# Patient Record
Sex: FEMALE | URBAN - METROPOLITAN AREA
[De-identification: names, ages, dates, MRNs, and addresses within clinical notes are randomized per-mention and may not be internally consistent; named-entity substitution may affect disease eponyms.]

---

## 2022-07-28 ENCOUNTER — OFFICE VISIT (OUTPATIENT)
Dept: URBAN - METROPOLITAN AREA CLINIC 15 | Facility: CLINIC | Age: 57
End: 2022-07-28
Payer: COMMERCIAL

## 2022-07-28 DIAGNOSIS — H02.052 TRICHIASIS WITHOUT ENTROPION RIGHT LOWER EYELID: Primary | ICD-10-CM

## 2022-07-28 PROCEDURE — 67820 REVISE EYELASHES: CPT | Performed by: OPTOMETRIST

## 2022-07-28 ASSESSMENT — INTRAOCULAR PRESSURE
OS: 12
OD: 12

## 2022-07-28 NOTE — IMPRESSION/PLAN
Impression: Trichiasis without entropion right lower eyelid: H02.052. Plan: Removed 3 inward turned eyelashes of inferior lid with sterile forceps. Procedure well-tolerated by patient. 

Mariana destruction of follicles

## 2022-09-08 ENCOUNTER — OFFICE VISIT (OUTPATIENT)
Dept: URBAN - METROPOLITAN AREA CLINIC 15 | Facility: CLINIC | Age: 57
End: 2022-09-08
Payer: COMMERCIAL

## 2022-09-08 DIAGNOSIS — H02.052 TRICHIASIS WITHOUT ENTROPION RIGHT LOWER EYELID: ICD-10-CM

## 2022-09-08 DIAGNOSIS — L71.9 ROSACEA: Primary | ICD-10-CM

## 2022-09-08 PROCEDURE — 99213 OFFICE O/P EST LOW 20 MIN: CPT | Performed by: OPTOMETRIST

## 2022-09-08 PROCEDURE — 67820 REVISE EYELASHES: CPT | Performed by: OPTOMETRIST

## 2022-09-08 RX ORDER — TOBRAMYCIN / DEXAMETHASONE 3; .5 MG/ML; MG/ML
SUSPENSION/ DROPS OPHTHALMIC
Qty: 5 | Refills: 0 | Status: INACTIVE
Start: 2022-09-08 | End: 2022-09-08

## 2022-09-08 RX ORDER — DOXYCYCLINE HYCLATE 50 MG/1
50 MG CAPSULE, GELATIN COATED ORAL
Qty: 120 | Refills: 0 | Status: ACTIVE
Start: 2022-09-08

## 2022-09-08 RX ORDER — TOBRAMYCIN / DEXAMETHASONE 3; .5 MG/ML; MG/ML
SUSPENSION/ DROPS OPHTHALMIC
Qty: 5 | Refills: 0 | Status: ACTIVE
Start: 2022-09-08

## 2022-09-08 NOTE — IMPRESSION/PLAN
Impression: Trichiasis without entropion right lower eyelid: H02.052. Plan: Removed 12 turned eyelash on inferior lid with sterile forceps. Procedure well-tolerated by patient.

## 2022-09-19 ENCOUNTER — OFFICE VISIT (OUTPATIENT)
Dept: URBAN - METROPOLITAN AREA CLINIC 15 | Facility: CLINIC | Age: 57
End: 2022-09-19
Payer: COMMERCIAL

## 2022-09-19 DIAGNOSIS — L71.9 ROSACEA: Primary | ICD-10-CM

## 2022-09-19 DIAGNOSIS — H02.052 TRICHIASIS WITHOUT ENTROPION RIGHT LOWER EYELID: ICD-10-CM

## 2022-09-19 PROCEDURE — 99213 OFFICE O/P EST LOW 20 MIN: CPT | Performed by: OPTOMETRIST

## 2022-09-19 PROCEDURE — 67820 REVISE EYELASHES: CPT | Performed by: OPTOMETRIST

## 2022-09-19 RX ORDER — ERYTHROMYCIN 5 MG/G
OINTMENT OPHTHALMIC
Qty: 10 | Refills: 2 | Status: ACTIVE
Start: 2022-09-19

## 2022-09-19 ASSESSMENT — INTRAOCULAR PRESSURE
OS: 12
OD: 12

## 2022-09-19 NOTE — IMPRESSION/PLAN
Impression: Rosacea: L71.9. Plan: D/c Tobradex. Continue Doxy BID PO and Erythroymcin dariusz. Start Avenova Lid Scrubs BID OU.

## 2022-09-19 NOTE — IMPRESSION/PLAN
Impression: Trichiasis without entropion right lower eyelid: H02.052. Plan: Removed 3 inward turned eyelash on inferior lid with sterile forceps. Procedure well-tolerated by patient.

## 2022-10-06 ENCOUNTER — OFFICE VISIT (OUTPATIENT)
Dept: URBAN - METROPOLITAN AREA CLINIC 15 | Facility: CLINIC | Age: 57
End: 2022-10-06
Payer: COMMERCIAL

## 2022-10-06 DIAGNOSIS — H02.052 TRICHIASIS WITHOUT ENTROPION RIGHT LOWER EYELID: ICD-10-CM

## 2022-10-06 DIAGNOSIS — L71.9 ROSACEA: Primary | ICD-10-CM

## 2022-10-06 PROCEDURE — 67820 REVISE EYELASHES: CPT | Performed by: OPTOMETRIST

## 2022-10-06 PROCEDURE — 99212 OFFICE O/P EST SF 10 MIN: CPT | Performed by: OPTOMETRIST

## 2022-10-06 ASSESSMENT — INTRAOCULAR PRESSURE
OS: 11
OD: 11

## 2022-10-06 NOTE — IMPRESSION/PLAN
Impression: Trichiasis without entropion right lower eyelid: H02.052. Plan: Removed 4 inward turned eyelash inferior lid with sterile forceps. 
Procedure well-tolerated by patient

## 2022-10-06 NOTE — IMPRESSION/PLAN
Impression: Rosacea: L71.9. Plan: Improved with doxycycline BID PO, Avenova BID OU, Systane QID OU. Continue tx and continue as scheduled with Dr. Aaron Patel for IPL OU.

## 2022-10-19 ENCOUNTER — OFFICE VISIT (OUTPATIENT)
Dept: URBAN - METROPOLITAN AREA CLINIC 30 | Facility: CLINIC | Age: 57
End: 2022-10-19
Payer: COMMERCIAL

## 2022-10-19 DIAGNOSIS — H04.123 TEAR FILM INSUFFICIENCY OF BILATERAL LACRIMAL GLANDS: ICD-10-CM

## 2022-10-19 DIAGNOSIS — L71.9 ROSACEA: ICD-10-CM

## 2022-10-19 DIAGNOSIS — H02.882 MGD/MEIBOMITIS, RIGHT LOWER LID: ICD-10-CM

## 2022-10-19 DIAGNOSIS — H02.885 MGD/MEIBOMITIS, LEFT LOWER LID: Primary | ICD-10-CM

## 2022-10-19 PROCEDURE — 0330T TEAR FILM IMG UNI/BI W/I&R: CPT | Performed by: OPTOMETRIST

## 2022-10-19 RX ORDER — PREDNISOLONE ACETATE 10 MG/ML
1 % SUSPENSION/ DROPS OPHTHALMIC
Qty: 5 | Refills: 0 | Status: ACTIVE
Start: 2022-10-19

## 2022-10-19 NOTE — IMPRESSION/PLAN
Impression: Rosacea: L71.9. Plan: Per Dr. Ravi Teran notes: Improved with doxycycline hyclate 50 mg BID PO, Avenova BID OU, Systane QID OU.

## 2022-10-19 NOTE — IMPRESSION/PLAN
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 Plan: Pt is present for 1st IPL today. Start WC's qhs OU. Continue Erythromycin- using PRN-QID.

## 2022-10-19 NOTE — IMPRESSION/PLAN
Impression: Tear film insufficiency of bilateral lacrimal glands: H04.123.
**possible allergy to tobradex** Plan: 1st IPL today. Pt notes photophobia. Pt notes eye strain, and scratchiness/itchiness OU. Start PA 1% BID OU x 8 weeks. Continue Systane AT's qid OU. Recommend O3's. Avoid ceiling fans. **REVIEW OCULUS FINDINGS* DEC 10/2022. Pt ed of condition that DED tends to be chronic (there is no cure) and will take time to treat based on severity. Discussed in detail ADDE/ARTEM. Recommend blinking exercises, O3's, WCs, and ATs QID OU. Oculus Keratograph results indicate OD 40%, OS 60% MG atrophy. Discussed options for treatment such as IPL/ Ilux in order to maintain MG function. Pt aware of out of pocket cost $400.

## 2022-10-31 ENCOUNTER — OFFICE VISIT (OUTPATIENT)
Dept: URBAN - METROPOLITAN AREA CLINIC 15 | Facility: CLINIC | Age: 57
End: 2022-10-31
Payer: COMMERCIAL

## 2022-10-31 DIAGNOSIS — S05.00XA CORNEAL ABRASION W/O FB OF EYE, INITIAL ENCOUNTER: Primary | ICD-10-CM

## 2022-10-31 PROCEDURE — 92071 CONTACT LENS FITTING FOR TX: CPT | Performed by: OPTOMETRIST

## 2022-10-31 PROCEDURE — 99213 OFFICE O/P EST LOW 20 MIN: CPT | Performed by: OPTOMETRIST

## 2022-10-31 RX ORDER — CIPROFLOXACIN HYDROCHLORIDE 3 MG/ML
0.3 % SOLUTION/ DROPS OPHTHALMIC
Qty: 5 | Refills: 0 | Status: INACTIVE
Start: 2022-10-31 | End: 2022-11-01

## 2022-10-31 ASSESSMENT — INTRAOCULAR PRESSURE
OD: 12
OS: 12

## 2022-10-31 NOTE — IMPRESSION/PLAN
Impression: Corneal abrasion w/o FB of eye, initial encounter: S05.00XA. Plan: Educated patient on exam findings and discussed importance of treatment with good compliance, likelihood of scarring, and visual prognosis. Rx Ciprofloxacin TID OD x 7d, Start Systane/Refresh PFAT q2hrs. Patient to call if increase in pain, redness, light sensitivity, or if vision changes occur. Air Optix N&D BCL placed today, well-tolerated. Patient is previous CL wearer, advised to remove BCL Wednesday Morning after floating CL with several ATs. Advised patient to call immediately if any vision loss, increased redness, pain.

## 2022-11-21 ENCOUNTER — OFFICE VISIT (OUTPATIENT)
Dept: URBAN - METROPOLITAN AREA CLINIC 15 | Facility: CLINIC | Age: 57
End: 2022-11-21
Payer: COMMERCIAL

## 2022-11-21 DIAGNOSIS — S05.01XD CORNEAL ABRASION W/O FB OF RIGHT EYE, SUBSEQUENT ENCOUNTER: Primary | ICD-10-CM

## 2022-11-21 PROCEDURE — 99213 OFFICE O/P EST LOW 20 MIN: CPT | Performed by: OPTOMETRIST

## 2022-11-21 ASSESSMENT — INTRAOCULAR PRESSURE
OD: 12
OS: 11

## 2022-11-21 NOTE — IMPRESSION/PLAN
Impression: Corneal abrasion w/o FB of right eye, subsequent encounter: S05. 01XD. Plan: Resolved. Continue ATs QID OU.

## 2022-11-21 NOTE — IMPRESSION/PLAN
Impression: Trichiasis without entropion right lower eyelid: H02.052. Plan: Placed BCL lens until patient sees Dr. Reece Benavides.

## 2022-11-23 ENCOUNTER — OFFICE VISIT (OUTPATIENT)
Dept: URBAN - METROPOLITAN AREA CLINIC 30 | Facility: CLINIC | Age: 57
End: 2022-11-23

## 2022-11-23 DIAGNOSIS — H02.885 MGD/MEIBOMITIS, LEFT LOWER LID: ICD-10-CM

## 2022-11-23 DIAGNOSIS — H04.123 TEAR FILM INSUFFICIENCY OF BILATERAL LACRIMAL GLANDS: Primary | ICD-10-CM

## 2022-11-23 DIAGNOSIS — H02.882 MGD/MEIBOMITIS, RIGHT LOWER LID: ICD-10-CM

## 2022-11-23 DIAGNOSIS — H02.052 TRICHIASIS WITHOUT ENTROPION RIGHT LOWER EYELID: ICD-10-CM

## 2022-11-23 NOTE — IMPRESSION/PLAN
Impression: Trichiasis without entropion right lower eyelid: H02.052. Plan: Secondary to MGD. BCL in place. See prev notes. Leave until next visit.

## 2022-11-23 NOTE — IMPRESSION/PLAN
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 Plan: Pt is present for 2nd IPL today. WC's qhs OU. Continue Erythromycin- using PRN-QID.

## 2022-12-01 ENCOUNTER — OFFICE VISIT (OUTPATIENT)
Dept: URBAN - METROPOLITAN AREA CLINIC 15 | Facility: CLINIC | Age: 57
End: 2022-12-01
Payer: COMMERCIAL

## 2022-12-01 DIAGNOSIS — H02.052 TRICHIASIS WITHOUT ENTROPION RIGHT LOWER EYELID: ICD-10-CM

## 2022-12-01 DIAGNOSIS — S05.01XA CORNEAL ABRASION W/O FB OF RIGHT EYE, INITIAL ENCOUNTER: Primary | ICD-10-CM

## 2022-12-01 PROCEDURE — 99213 OFFICE O/P EST LOW 20 MIN: CPT | Performed by: OPTOMETRIST

## 2022-12-01 ASSESSMENT — INTRAOCULAR PRESSURE
OS: 12
OD: 10

## 2022-12-01 NOTE — IMPRESSION/PLAN
Impression: Trichiasis without entropion right lower eyelid: H02.052. Plan: Will setup appointment with Dr. Joel Rg for Shelia Zapata procedure RLL next available as Dr. Rishi Lester is not performing [place] procedure at this time. Will continue with weekly BCL replacement until procedure.  

Consider OCP as differential.

## 2022-12-01 NOTE — IMPRESSION/PLAN
Impression: Corneal abrasion w/o FB of right eye, initial encounter: S05. 01XA. Plan: Secondary to trichiasis. Dr. Littie Alpers could not get patient in before her appointment with Dr. Rod Wynn, will have patient continue with scheduled appointment with Dr. Rod Wynn on 12/12/22 BCL placed - Biofinity lens -  Cipro QID x 2 days then BID x 5 days RTC 1wk for BCL replacement

## 2022-12-06 ENCOUNTER — OFFICE VISIT (OUTPATIENT)
Dept: URBAN - METROPOLITAN AREA CLINIC 15 | Facility: CLINIC | Age: 57
End: 2022-12-06
Payer: COMMERCIAL

## 2022-12-06 DIAGNOSIS — S05.01XD CORNEAL ABRASION W/O FB OF RIGHT EYE, SUBSEQUENT ENCOUNTER: Primary | ICD-10-CM

## 2022-12-06 PROCEDURE — 99212 OFFICE O/P EST SF 10 MIN: CPT

## 2022-12-06 NOTE — IMPRESSION/PLAN
Impression: Corneal abrasion w/o FB of right eye, subsequent encounter: S05. 01XD. Plan: Resolved. D/c Ciprofloxacin.  Placed BCL per Dr. Luke Mackey request. Pt to continue as scheduled with Dr. Gold Moses on 12-8-22

## 2022-12-08 ENCOUNTER — PROCEDURE (OUTPATIENT)
Dept: URBAN - METROPOLITAN AREA CLINIC 51 | Facility: CLINIC | Age: 57
End: 2022-12-08
Payer: COMMERCIAL

## 2022-12-08 DIAGNOSIS — H02.052 TRICHIASIS WITHOUT ENTROPION RIGHT LOWER EYELID: Primary | ICD-10-CM

## 2022-12-08 PROCEDURE — 67825 REVISE EYELASHES: CPT | Performed by: STUDENT IN AN ORGANIZED HEALTH CARE EDUCATION/TRAINING PROGRAM

## 2022-12-08 PROCEDURE — 68110 EXC LES CONJUNCTIVA <1 CM: CPT | Performed by: STUDENT IN AN ORGANIZED HEALTH CARE EDUCATION/TRAINING PROGRAM

## 2022-12-08 PROCEDURE — 99204 OFFICE O/P NEW MOD 45 MIN: CPT | Performed by: STUDENT IN AN ORGANIZED HEALTH CARE EDUCATION/TRAINING PROGRAM

## 2022-12-08 RX ORDER — ERYTHROMYCIN 5 MG/G
OINTMENT OPHTHALMIC
Qty: 2 | Refills: 2 | Status: ACTIVE
Start: 2022-12-08

## 2022-12-08 NOTE — IMPRESSION/PLAN
Impression: Trichiasis without entropion right lower eyelid: H02.052. Plan: RLL trichiasis. Onset approx 7-8 months ago. Tarsal/forniceal conjunctival scarring and hyperemia. No symblepharon. No other mucous membrane lesions. + history of autoimmune disease - Hashimoto's thyroiditis. Family history of autoimmune diseases (mother with Sjogren's/CREST). S/p epilation of lashes multiple times. Suffered corneal abrasion 2/2 trichiasis. Wearing BCL at all times now. Recommend conjunctival biopsy to assess for OCP and ellman electroepilation of lashes today. Risks, benefits, and alternatives of procedure were discussed. Risks include, but are not limited to, bleeding, infection, scarring, asymmetry, need for additional surgery, injury to eye/vision loss. Written informed consent obtained and all of patient's questions answered. Procedure Note - The eyelid was cleaned and then anesthetized with 2% lidocaine with epinephrine both transcutaneous and transconjunctival. A biopsy (approximately 0.2cm) of the forniceal conjunctiva in area of scarring/inflammation was obtained with Vannas scissors and submitted in Bola's media for immunofluorescence. The biopsy site was cauterized as necessary to achieve hemostasis. Next, Electrocautery was used to destroy the involved lash follicles of the RLL. Antibiotic ointment was applied to the biopsy site and lower lid. The patient tolerated the procedure well, and the procedure was completed without complications. The patient will be contacted with the results of pathology. Apply abx ointment bid x 1-2 weeks. Post op instructions reviewed. Return oculoplastics clinic 4-6 weeks. Call if any questions/concerns.

## 2022-12-21 ENCOUNTER — OFFICE VISIT (OUTPATIENT)
Dept: URBAN - METROPOLITAN AREA CLINIC 30 | Facility: CLINIC | Age: 57
End: 2022-12-21

## 2022-12-21 DIAGNOSIS — H02.885 MGD/MEIBOMITIS, LEFT LOWER LID: ICD-10-CM

## 2022-12-21 DIAGNOSIS — H04.123 TEAR FILM INSUFFICIENCY OF BILATERAL LACRIMAL GLANDS: ICD-10-CM

## 2022-12-21 DIAGNOSIS — H02.882 MGD/MEIBOMITIS, RIGHT LOWER LID: Primary | ICD-10-CM

## 2022-12-21 NOTE — IMPRESSION/PLAN
Impression: Tear film insufficiency of bilateral lacrimal glands: H04.123.
**possible allergy to tobradex** Plan: 3rd IPL today. Pt notes photophobia. Pt notes eye strain, and scratchiness/itchiness OU. Finished PA 1% BID OU. Continue Systane AT's qid OU. O3's. Avoid ceiling fans. DEC 10/2022. Pt ed of condition that DED tends to be chronic (there is no cure) and will take time to treat based on severity. Discussed in detail ADDE/ARTEM. Recommend blinking exercises, O3's, WCs, and ATs QID OU. Oculus Keratograph results indicate OD 40%, OS 60% MG atrophy. Discussed options for treatment such as IPL/ Ilux in order to maintain MG function. Pt aware of out of pocket cost $400.

## 2022-12-21 NOTE — IMPRESSION/PLAN
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 Plan: Pt is present for 3rd IPL today. WC's qhs OU. Continue Erythromycin- using PRN-QID.

## 2023-01-03 ENCOUNTER — OFFICE VISIT (OUTPATIENT)
Dept: URBAN - METROPOLITAN AREA CLINIC 15 | Facility: CLINIC | Age: 58
End: 2023-01-03
Payer: COMMERCIAL

## 2023-01-03 DIAGNOSIS — H02.882 MGD/MEIBOMITIS, RIGHT LOWER LID: ICD-10-CM

## 2023-01-03 DIAGNOSIS — H02.885 MGD/MEIBOMITIS, LEFT LOWER LID: ICD-10-CM

## 2023-01-03 DIAGNOSIS — H02.052 TRICHIASIS WITHOUT ENTROPION RIGHT LOWER EYELID: Primary | ICD-10-CM

## 2023-01-03 PROCEDURE — 99213 OFFICE O/P EST LOW 20 MIN: CPT | Performed by: OPTOMETRIST

## 2023-01-03 ASSESSMENT — INTRAOCULAR PRESSURE
OD: 12
OS: 12

## 2023-01-03 NOTE — IMPRESSION/PLAN
Impression: Trichiasis without entropion right lower eyelid: H02.052. Plan: Patient has 5 inward turned eyelash inferior lid. Patient has last IPL scheduled in 3 weeks. ** Placed new Biofinity BCL lens OD.

## 2023-01-11 ENCOUNTER — OFFICE VISIT (OUTPATIENT)
Dept: URBAN - METROPOLITAN AREA CLINIC 44 | Facility: CLINIC | Age: 58
End: 2023-01-11
Payer: COMMERCIAL

## 2023-01-11 DIAGNOSIS — H02.052 TRICHIASIS WITHOUT ENTROPION RIGHT LOWER EYELID: Primary | ICD-10-CM

## 2023-01-11 PROCEDURE — 67825 REVISE EYELASHES: CPT | Performed by: STUDENT IN AN ORGANIZED HEALTH CARE EDUCATION/TRAINING PROGRAM

## 2023-01-11 PROCEDURE — 99214 OFFICE O/P EST MOD 30 MIN: CPT | Performed by: STUDENT IN AN ORGANIZED HEALTH CARE EDUCATION/TRAINING PROGRAM

## 2023-01-11 NOTE — IMPRESSION/PLAN
Impression: Trichiasis without entropion right lower eyelid: H02.052. Plan: RLL trichiasis. Onset approx 7-8 months ago. Tarsal/forniceal conjunctival scarring and hyperemia. No symblepharon. No other mucous membrane lesions. + history of autoimmune disease - Hashimoto's thyroiditis. Family history of autoimmune diseases (mother with Sjogren's/CREST). S/p epilation of lashes multiple times. Suffered corneal abrasion 2/2 trichiasis. **Conjunctival biopsy negative for OCP Risks, benefits, and alternatives of procedure were discussed. Trichiasis Ellman Procedure- The region of the eyelid containing the trichiac lashes was anesthetized with 2% lidocaine with epinephrine. Electrocautery was used to destroy the involved lash follicles. Apply abx ointment 2 times per day for 1-2 weeks. RTC oculoplastics 6-8 weeks procedure clinic for repeat ellman. Call sooner prn.

## 2023-01-25 ENCOUNTER — OFFICE VISIT (OUTPATIENT)
Dept: URBAN - METROPOLITAN AREA CLINIC 30 | Facility: CLINIC | Age: 58
End: 2023-01-25

## 2023-01-25 DIAGNOSIS — H02.882 MGD/MEIBOMITIS, RIGHT LOWER LID: Primary | ICD-10-CM

## 2023-01-25 DIAGNOSIS — H02.885 MGD/MEIBOMITIS, LEFT LOWER LID: ICD-10-CM

## 2023-01-25 DIAGNOSIS — H04.123 TEAR FILM INSUFFICIENCY OF BILATERAL LACRIMAL GLANDS: ICD-10-CM

## 2023-01-25 RX ORDER — PREDNISOLONE ACETATE 10 MG/ML
1 % SUSPENSION/ DROPS OPHTHALMIC
Qty: 5 | Refills: 0 | Status: ACTIVE
Start: 2023-01-25

## 2023-01-25 NOTE — IMPRESSION/PLAN
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 Plan: Pt is present for 4th IPL today. WC's qhs OU. Continue Erythromycin- using PRN-QID.

## 2023-01-25 NOTE — IMPRESSION/PLAN
Impression: MGD/Meibomitis, left lower lid: H02.885. Plan: See other notes for correlations and findings.

## 2023-01-25 NOTE — IMPRESSION/PLAN
Impression: Tear film insufficiency of bilateral lacrimal glands: H04.123.
**possible allergy to tobradex** Plan: 4th PL today. Pt notes photophobia. Pt notes eye strain, and scratchiness/itchiness OU. Renewed PA 1% QD x 2 weeks. . Continue Systane AT's qid OU. O3's. Avoid ceiling fans. DEC 10/2022. Pt ed of condition that DED tends to be chronic (there is no cure) and will take time to treat based on severity. Discussed in detail ADDE/ARTEM. Recommend blinking exercises, O3's, WCs, and ATs QID OU. Oculus Keratograph results indicate OD 40%, OS 60% MG atrophy. Discussed options for treatment such as IPL/ Ilux in order to maintain MG function. Pt aware of out of pocket cost $400.

## 2023-03-02 ENCOUNTER — OFFICE VISIT (OUTPATIENT)
Dept: URBAN - METROPOLITAN AREA CLINIC 15 | Facility: CLINIC | Age: 58
End: 2023-03-02
Payer: COMMERCIAL

## 2023-03-02 DIAGNOSIS — H02.052 TRICHIASIS WITHOUT ENTROPION RIGHT LOWER EYELID: Primary | ICD-10-CM

## 2023-03-02 PROCEDURE — 92071 CONTACT LENS FITTING FOR TX: CPT | Performed by: OPTOMETRIST

## 2023-03-02 PROCEDURE — 99212 OFFICE O/P EST SF 10 MIN: CPT | Performed by: OPTOMETRIST

## 2023-03-02 ASSESSMENT — INTRAOCULAR PRESSURE
OD: 11
OS: 12

## 2023-03-02 NOTE — IMPRESSION/PLAN
Impression: Trichiasis without entropion right lower eyelid: H02.052. Plan: Biofinity +0.50 BCL placed today, well-tolerated. Patient to F/U as scheduled with Dr. Ratna Otero.

## 2023-03-07 ENCOUNTER — OFFICE VISIT (OUTPATIENT)
Dept: URBAN - METROPOLITAN AREA CLINIC 43 | Facility: CLINIC | Age: 58
End: 2023-03-07
Payer: COMMERCIAL

## 2023-03-07 DIAGNOSIS — H02.052 TRICHIASIS WITHOUT ENTROPION RIGHT LOWER EYELID: Primary | ICD-10-CM

## 2023-03-07 PROCEDURE — 67825 REVISE EYELASHES: CPT | Performed by: STUDENT IN AN ORGANIZED HEALTH CARE EDUCATION/TRAINING PROGRAM

## 2023-03-07 PROCEDURE — 99213 OFFICE O/P EST LOW 20 MIN: CPT | Performed by: STUDENT IN AN ORGANIZED HEALTH CARE EDUCATION/TRAINING PROGRAM

## 2023-03-07 NOTE — IMPRESSION/PLAN
Impression: Trichiasis without entropion right lower eyelid: H02.052. Plan: Fewer trichiatic lashes noted today after last 2 ellman electroepilation. Approx 5 trichiatic lashes remain. Risks, benefits, and alternatives of procedure were discussed. Risks include, but are not limited to, bleeding, infection, scarring, asymmetry, recurrent trichiasis, need for additional surgery/procedures, injury to eye/vision loss. Written informed consent obtained and all of patient's questions answered. Trichiasis Ellman Procedure- The region of the eyelid containing the trichiac lashes was anesthetized with 2% lidocaine with epinephrine. Electrocautery was used to destroy the involved lash follicles. Apply abx ointment 2 times per day for 1-2 weeks. RTC oculoplastics 2-3 months, sooner prn.